# Patient Record
Sex: FEMALE
[De-identification: names, ages, dates, MRNs, and addresses within clinical notes are randomized per-mention and may not be internally consistent; named-entity substitution may affect disease eponyms.]

---

## 2021-04-24 ENCOUNTER — NURSE TRIAGE (OUTPATIENT)
Dept: OTHER | Facility: CLINIC | Age: 46
End: 2021-04-24

## 2021-04-24 NOTE — TELEPHONE ENCOUNTER
Brief description of triage: see below    Triage indicates for patient to go to ED Now. Pt agreeable to POC. Care advice provided, patient verbalizes understanding; denies any other questions or concerns; instructed to call back for any new or worsening symptoms. This triage is a result of a call to [address] [place] [address]. Please do not respond to the triage nurse through this encounter. Any subsequent communication should be directly with the patient. Reason for Disposition   Pain in the upper back over the ribs (rib cage) that radiates (travels) into the chest   Long-distance travel in past month (e.g., car, bus, train, plane; with trip lasting 6 or more hours)    Answer Assessment - Initial Assessment Questions  1. ONSET: \"When did the pain begin? \"       Pt stated pain started before leaving for vacation a week ago when working in garden; worsening today    2. LOCATION: \"Where does it hurt? \" (upper, mid or lower back)      Pt stated pain is  3. SEVERITY: \"How bad is the pain? \"  (e.g., Scale 1-10; mild, moderate, or severe)    - MILD (1-3): doesn't interfere with normal activities     - MODERATE (4-7): interferes with normal activities or awakens from sleep     - SEVERE (8-10): excruciating pain, unable to do any normal activities       Pt states pain 4/10 at this time at rest and increases to 8/10 upon movement    4. PATTERN: \"Is the pain constant? \" (e.g., yes, no; constant, intermittent)       Pt states constant    5. RADIATION: \"Does the pain shoot into your legs or elsewhere? \"      Pt states upper back and wraps around to her ribs    6. CAUSE:  \"What do you think is causing the back pain? \"       Pt thinking the work she was doing     7. BACK OVERUSE:  Terry Snow recent lifting of heavy objects, strenuous work or exercise? \"      Stated she was working out in the yard    8. MEDICATIONS: \"What have you taken so far for the pain? \" (e.g., nothing, acetaminophen, NSAIDS)      Ibuprofen and a muscle relaxant with some relief    9. NEUROLOGIC SYMPTOMS: \"Do you have any weakness, numbness, or problems with bowel/bladder control? \"      Pt states numbness down her arms and into her hands for the last week     10. OTHER SYMPTOMS: \"Do you have any other symptoms? \" (e.g., fever, abdominal pain, burning with urination, blood in urine)        Pt denies    11. PREGNANCY: \"Is there any chance you are pregnant? \" (e.g., yes, no; LMP)        N/A    Protocols used: BACK PAIN-ADULT-[place], CHEST PAIN-ADULT-[place]